# Patient Record
Sex: FEMALE | Race: WHITE | NOT HISPANIC OR LATINO | Employment: FULL TIME | URBAN - METROPOLITAN AREA
[De-identification: names, ages, dates, MRNs, and addresses within clinical notes are randomized per-mention and may not be internally consistent; named-entity substitution may affect disease eponyms.]

---

## 2025-06-30 ENCOUNTER — OFFICE VISIT (OUTPATIENT)
Dept: URGENT CARE | Age: 20
End: 2025-06-30
Payer: COMMERCIAL

## 2025-06-30 VITALS
RESPIRATION RATE: 16 BRPM | HEIGHT: 66 IN | BODY MASS INDEX: 25.71 KG/M2 | SYSTOLIC BLOOD PRESSURE: 107 MMHG | DIASTOLIC BLOOD PRESSURE: 78 MMHG | WEIGHT: 160 LBS | TEMPERATURE: 98.5 F | HEART RATE: 60 BPM | OXYGEN SATURATION: 98 %

## 2025-06-30 DIAGNOSIS — J02.9 SORE THROAT: ICD-10-CM

## 2025-06-30 LAB
POC CORONAVIRUS SARS-COV-2 PCR: NEGATIVE
POC HUMAN RHINOVIRUS PCR: NEGATIVE
POC INFLUENZA A VIRUS PCR: NEGATIVE
POC INFLUENZA B VIRUS PCR: NEGATIVE
POC RAPID STREP: NEGATIVE
POC RESPIRATORY SYNCYTIAL VIRUS PCR: NEGATIVE

## 2025-06-30 ASSESSMENT — ENCOUNTER SYMPTOMS: SORE THROAT: 1

## 2025-06-30 NOTE — PROGRESS NOTES
"Subjective   Patient ID: Brisa Perez is a 19 y.o. female. They present today with a chief complaint of Sore Throat (X1 day; wisdom teeth removed one week ago/).    History of Present Illness  Subjective   Patient is a 19-year-old female who presents with her mother for one day of a sore throat. She states that swallowing is painful. She had her wisdom teeth removed last week and was prescribed Augmentin. She has a history of tonsillectomy. She has also been experiencing intermittent bilateral ear pain. She denies fevers, chills, cough, nasal congestion, and runny nose.     Review of Systems   Constitutional:  Denies fever, chills, malaise, fatigue  Eye:  Denies itching, tearing, redness, photophobia, visual changes.    ENT: Denies nasal congestion, rhinorrhea, ear discharge, sinus pain/pressure  Respiratory:  Denies shortness of breath, cough, wheezing, sputum production  Cardiovascular:  Denies chest pain, palpitations, lightheadedness, dizziness, syncope.    Gastrointestinal:  Denies abdominal pain, nausea, vomiting, diarrhea, constipation    Neurologic:  Denies numbness, weakness, paresthesia    All other systems are negative        Sore Throat         Past Medical History  Allergies as of 06/30/2025    (No Known Allergies)       Prescriptions Prior to Admission[1]     Medical History[2]    Surgical History[3]     reports that she has quit smoking. Her smoking use included cigarettes. She has never been exposed to tobacco smoke. She has never used smokeless tobacco.    Review of Systems  Review of Systems   HENT:  Positive for sore throat.                                   Objective    Vitals:    06/30/25 1240   BP: 107/78   Pulse: 60   Resp: 16   Temp: 36.9 °C (98.5 °F)   TempSrc: Oral   SpO2: 98%   Weight: 72.6 kg (160 lb)   Height: 1.676 m (5' 6\")     Patient's last menstrual period was 06/02/2025 (approximate).    Physical Exam  Vitals and nursing note reviewed.   Constitutional:       General: " She is not in acute distress.     Appearance: Normal appearance. She is normal weight. She is not ill-appearing, toxic-appearing or diaphoretic.   HENT:      Right Ear: Tympanic membrane, ear canal and external ear normal. There is no impacted cerumen.      Left Ear: Tympanic membrane, ear canal and external ear normal. There is no impacted cerumen.      Nose: Nose normal. No congestion or rhinorrhea.      Mouth/Throat:      Mouth: Mucous membranes are moist.      Pharynx: Oropharynx is clear. No pharyngeal swelling, oropharyngeal exudate, posterior oropharyngeal erythema, uvula swelling or postnasal drip.      Tonsils: No tonsillar exudate or tonsillar abscesses. 0 on the right. 0 on the left.   Eyes:      General: No scleral icterus.        Right eye: No discharge.         Left eye: No discharge.      Conjunctiva/sclera: Conjunctivae normal.   Cardiovascular:      Rate and Rhythm: Normal rate and regular rhythm.      Pulses: Normal pulses.      Heart sounds: Normal heart sounds. No murmur heard.     No friction rub. No gallop.   Pulmonary:      Effort: Pulmonary effort is normal. No respiratory distress.      Breath sounds: Normal breath sounds. No stridor. No wheezing, rhonchi or rales.   Musculoskeletal:      Cervical back: Normal range of motion and neck supple. No rigidity or tenderness.   Lymphadenopathy:      Cervical: No cervical adenopathy.   Skin:     General: Skin is warm and dry.      Coloration: Skin is not jaundiced or pale.      Findings: No bruising, erythema or rash.   Neurological:      General: No focal deficit present.      Mental Status: She is alert and oriented to person, place, and time.      Sensory: No sensory deficit.         Procedures    Point of Care Test & Imaging Results from this visit  No results found for this visit on 06/30/25.   Imaging  No results found.    Cardiology, Vascular, and Other Imaging  No other imaging results found for the past 2 days      Diagnostic study results  (if any) were reviewed by GARRISON Lim.    Assessment/Plan   Allergies, medications, history, and pertinent labs/EKGs/Imaging reviewed by GARRISON Lim.     Medical Decision Making    The patient's medical history, current medications, and allergies were reviewed and verified during today's visit.  Any discrepancies and updates were made when necessary.     On exam, her vital signs are stable, and she is in no acute distress. Her airway is patent and respirations are even and unlabored. She speaks in full and complete sentences and manages her secretions without drooling. There is no erythema or exudate present in the posterior pharynx.  Her ear exam is normal. The rapid strep test was negative. Viral swab testing was also negative. I advised her to take ibuprofen or Tylenol for pain. I asked that she go to the emergency room for any difficulty breathing or swallowing. She stated that she would seek appropriate follow-up and did not have any additional questions.     At time of discharge patient was clinically well-appearing and appropriate for outpatient management. The patient was educated regarding diagnosis, supportive care, OTC and Rx medications. The patient was given the opportunity to ask questions prior to discharge.  They verbalized understanding of my discussion of the plans for treatment, expected course, indications to return to  or seek further evaluation in ED, and the need for timely follow up as directed.      Orders and Diagnoses  Diagnoses and all orders for this visit:  Sore throat  -     POCT SPOTFIRE R/ST Panel Mini w/COVID ([facility]) manually resulted  -     POCT rapid strep A manually resulted      Medical Admin Record      Patient disposition: Home    Electronically signed by GARRISON Lim  12:56 PM           [1] (Not in a hospital admission)  [2]   Past Medical History:  Diagnosis Date    Acute frontal sinusitis, unspecified 01/10/2018    Acute  non-recurrent frontal sinusitis    Acute pharyngitis, unspecified 03/25/2017    Acute viral pharyngitis    Acute pharyngitis, unspecified 11/10/2016    Sore throat    Acute pharyngitis, unspecified 09/15/2015    Sore throat    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    Fever, unspecified 03/27/2017    Fever in pediatric patient    Other conditions influencing health status 03/27/2017    History of cough    Other fatigue 03/27/2017    Other fatigue    Other fatigue 11/10/2016    Fatigue, unspecified type    Personal history of other diseases of the digestive system 10/28/2014    History of stomatitis    Personal history of other diseases of the digestive system 11/10/2016    History of angular cheilitis    Personal history of other diseases of the respiratory system 03/27/2017    History of acute pharyngitis    Personal history of other diseases of the respiratory system 01/19/2021    History of sore throat    Personal history of other diseases of the respiratory system 11/10/2016    History of pharyngitis    Personal history of other diseases of the respiratory system     History of sore throat    Personal history of other diseases of the respiratory system 11/16/2018    History of acute pharyngitis    Personal history of other diseases of the respiratory system 03/03/2016    History of pharyngitis    Personal history of other diseases of the respiratory system 02/19/2019    History of acute pharyngitis    Personal history of other specified conditions 05/13/2015    History of fever    Sprain of unspecified ligament of right ankle, initial encounter 08/15/2018    Right ankle sprain    Streptococcus, group A, as the cause of diseases classified elsewhere 11/10/2016    Group A streptococcal infection    Streptococcus, group A, as the cause of diseases classified elsewhere 03/28/2017    Group A streptococcal infection    Unspecified abdominal pain 11/10/2016    Stomach ache    Unspecified injury of  right ankle, initial encounter     Injury of right ankle, initial encounter   [3]   Past Surgical History:  Procedure Laterality Date    TONSILLECTOMY  10/23/2013    Tonsillectomy